# Patient Record
Sex: MALE | Employment: FULL TIME | URBAN - METROPOLITAN AREA
[De-identification: names, ages, dates, MRNs, and addresses within clinical notes are randomized per-mention and may not be internally consistent; named-entity substitution may affect disease eponyms.]

---

## 2017-06-28 ENCOUNTER — OFFICE VISIT (OUTPATIENT)
Dept: INTERNAL MEDICINE CLINIC | Age: 47
End: 2017-06-28

## 2017-06-28 VITALS
WEIGHT: 198.13 LBS | DIASTOLIC BLOOD PRESSURE: 66 MMHG | OXYGEN SATURATION: 98 % | RESPIRATION RATE: 18 BRPM | HEART RATE: 76 BPM | BODY MASS INDEX: 28.36 KG/M2 | SYSTOLIC BLOOD PRESSURE: 111 MMHG | HEIGHT: 70 IN | TEMPERATURE: 98.2 F

## 2017-06-28 DIAGNOSIS — Z00.00 PREVENTATIVE HEALTH CARE: Primary | ICD-10-CM

## 2017-06-28 RX ORDER — GLUCOSAMINE SULFATE 1500 MG
POWDER IN PACKET (EA) ORAL DAILY
COMMUNITY

## 2017-06-28 RX ORDER — LANOLIN ALCOHOL/MO/W.PET/CERES
1000 CREAM (GRAM) TOPICAL DAILY
COMMUNITY

## 2017-06-28 RX ORDER — LANOLIN ALCOHOL/MO/W.PET/CERES
1 CREAM (GRAM) TOPICAL DAILY
COMMUNITY

## 2017-06-28 NOTE — PATIENT INSTRUCTIONS

## 2017-06-28 NOTE — PROGRESS NOTES
HISTORY OF PRESENT ILLNESS  Lon Cunningham is a 49 y.o. male. HPI  Lon Cunningham is here for complete health maintenance physical exam and screening. he does have other concerns. Sporadic elevation in BP in May - saw bp's 140-150's/  over several weeks. Logged bps over last 2 weeks - 110-130's/60-70's  Feels fine. allergic rhinitis - mild chronic congestion - using flonase prn. Health maintenance hx includes:  Exercise: very active. Form of exercise:  Biking, wts, running   Diet: generally follows a low fat low cholesterol diet, generally follows a low sodium diet, pescatarian  [employer]       No results found for: CHOL, CHOLPOCT, CHOLX, CHLST, CHOLV, HDL, HDLPOC, LDL, LDLCPOC, LDLC, DLDLP, VLDLC, VLDL, TGLX, TRIGL, TRIGP, TGLPOCT, CHHD, CHHDX    No results found for: GLU, GLUCPOC      Immunizations:     Immunization History   Administered Date(s) Administered    Td, Adsorbed PF 08/21/2013    Tdap 06/28/2016      Immunization status: up to date and documented. Social History     Social History    Marital status:      Spouse name: N/A    Number of children: N/A    Years of education: N/A     Occupational History    Not on file.      Social History Main Topics    Smoking status: Never Smoker    Smokeless tobacco: Never Used    Alcohol use 1.5 oz/week     1 Glasses of wine, 1 Cans of beer, 1 Shots of liquor per week    Drug use: No    Sexual activity: Yes     Partners: Female     Other Topics Concern    Not on file     Social History Narrative     Past Surgical History:   Procedure Laterality Date    HX HERNIA REPAIR  2006    bilateral inguinal     Family History   Problem Relation Age of Onset    Thyroid Disease Mother     Hypertension Mother     High Cholesterol Mother     COPD Father     Hypertension Father     Cancer Maternal Grandmother      breast    Diabetes Maternal Grandmother      type 1     Current Outpatient Prescriptions on File Prior to Visit Medication Sig Dispense Refill    multivitamin (ONE A DAY) tablet Take 1 Tab by mouth daily.  MAGNESIUM CITRATE PO Take  by mouth.  ibuprofen (MOTRIN) 200 mg tablet Take  by mouth.  tretinoin (RETIN-A) 0.05 % topical cream APPLY TO AFFECTED AREA NIGHTLY 20 g 3    fluticasone (FLONASE) 50 mcg/actuation nasal spray USE 2 SPRAY IN EACH NOSTRIL   EVERY DAY (Patient taking differently: daily as needed. USE 2 SPRAY IN EACH NOSTRIL   EVERY DAY) 16 g 11     No current facility-administered medications on file prior to visit. .    Review of Systems   Constitutional: Positive for diaphoresis (rare sporadic soaking night sweats without any other symptoms). Negative for malaise/fatigue and weight loss. Eyes: Negative for blurred vision and pain. Respiratory: Negative for cough, shortness of breath and wheezing. Cardiovascular: Negative for chest pain, palpitations and leg swelling. Gastrointestinal: Negative for abdominal pain, blood in stool, constipation, diarrhea, heartburn, nausea and vomiting. Genitourinary: Negative. Musculoskeletal: Negative for back pain, joint pain and myalgias. Skin: Negative for rash. Neurological: Negative for dizziness and headaches. Endo/Heme/Allergies: Positive for environmental allergies. Does not bruise/bleed easily. Psychiatric/Behavioral: Negative for depression. The patient is not nervous/anxious and does not have insomnia. Physical Exam   Constitutional: He is oriented to person, place, and time. He appears well-developed and well-nourished. No distress. Body mass index is 28.43 kg/(m^2). /66 (BP 1 Location: Left arm, BP Patient Position: Sitting)  Pulse 76  Temp 98.2 °F (36.8 °C) (Oral)   Resp 18  Ht 5' 10\" (1.778 m)  Wt 198 lb 2 oz (89.9 kg)  SpO2 98%  BMI 28.43 kg/m2   HENT:   Head: Normocephalic and atraumatic.    Right Ear: Hearing, tympanic membrane and ear canal normal.   Left Ear: Hearing, tympanic membrane and ear canal normal.   Nose: Nose normal.   Mouth/Throat: Oropharynx is clear and moist and mucous membranes are normal. Normal dentition. Eyes: Conjunctivae and lids are normal. Pupils are equal, round, and reactive to light. Right eye exhibits no discharge. Left eye exhibits no discharge. No scleral icterus. Neck: Trachea normal. No thyromegaly present. Cardiovascular: Normal rate, regular rhythm, normal heart sounds, intact distal pulses and normal pulses. Exam reveals no gallop and no friction rub. No murmur heard. Pulmonary/Chest: Effort normal and breath sounds normal. No respiratory distress. Abdominal: Soft. Normal appearance and bowel sounds are normal. He exhibits no distension and no mass. There is no hepatosplenomegaly. There is no tenderness. There is no CVA tenderness. Musculoskeletal: Normal range of motion. He exhibits no edema or tenderness. Lymphadenopathy:     He has no cervical adenopathy. Right: No inguinal adenopathy present. Left: No inguinal adenopathy present. Neurological: He is alert and oriented to person, place, and time. Skin: Skin is warm and dry. No rash noted. He is not diaphoretic. Scattered small pigmented typical benign appearing nevi over back,      Psychiatric: He has a normal mood and affect. His speech is normal and behavior is normal. Judgment and thought content normal. Cognition and memory are normal.   Nursing note and vitals reviewed. ASSESSMENT and PLAN  Alba Quinteros was seen today for well male. Diagnoses and all orders for this visit:    Preventative health care  -     METABOLIC PANEL, BASIC  -     MAGNESIUM  -     LIPID PANEL  -     CBC WITH AUTOMATED DIFF  Taya Mitchell was counseled on age-appropriate/ guideline-based risk prevention behaviors and screening for a 49y.o. year old   male . We also discussed adjustments in screening based on family history if necessary.    Printed instructions for preventative screening guidelines and healthy behaviors given to patient with after visit summary. Sporadic elevation of bp now normalized. Log blood pressures at home while sitting, relaxed 3-5 times monthly a  Pt educated on goal BP of 130/80 on average or lower. Call office as soon as possible if BP's over 140/90 or below 110/50 on multiple occasions and/or with symptoms of dizziness, chest pain, shortness of breath, headache or ankle swelling. Recheck log and bp here in 1 year(s). .  Follow-up Disposition:  Return in about 1 year (around 6/28/2018).

## 2017-06-28 NOTE — MR AVS SNAPSHOT
Visit Information Date & Time Provider Department Dept. Phone Encounter #  
 6/28/2017 11:15 AM Phillip Mar MD [facility] [address] [id number] Follow-up Instructions Return in about 1 year (around 6/28/2018). Upcoming Health Maintenance Date Due DTaP/Tdap/Td series (1 - Tdap) 8/22/2013 INFLUENZA AGE 9 TO ADULT 8/1/2017 Allergies as of 6/28/2017  Review Complete On: 6/28/2017 By: Phillip Mar MD  
  
 Severity Noted Reaction Type Reactions Tiger Balm [Camphor-menthol]  05/31/2011    Contact Dermatitis Current Immunizations  Reviewed on 6/28/2017 Name Date Td, Adsorbed PF 8/21/2013  6:17 PM  
 Tdap 6/28/2016 Reviewed by Phillip Mar MD on 6/28/2017 at 11:45 AM  
 Reviewed by Phillip Mar MD on 6/28/2017 at 11:46 AM  
You Were Diagnosed With   
  
 Codes Comments Preventative health care    -  Primary ICD-10-CM: Z00.00 ICD-9-CM: V70.0 Vitals BP Pulse Temp Resp Height(growth percentile) Weight(growth percentile) 111/66 (BP 1 Location: Left arm, BP Patient Position: Sitting) 76 98.2 °F (36.8 °C) (Oral) 18 5' 10\" (1.778 m) 198 lb 2 oz (89.9 kg) SpO2 BMI Smoking Status 98% 28.43 kg/m2 Never Smoker Vitals History BMI and BSA Data Body Mass Index Body Surface Area  
 28.43 kg/m 2 2.11 m 2 Preferred Pharmacy Pharmacy Name Phone [id number] [address] [phone] Your Updated Medication List  
  
   
This list is accurate as of: 6/28/17 12:01 PM.  Always use your most recent med list.  
  
  
  
  
 CALCIUM 600 + D 600-125 mg-unit Tab Generic drug:  calcium-cholecalciferol (d3) Take  by mouth. fluticasone 50 mcg/actuation nasal spray Commonly known as:  FLONASE  
USE 2 SPRAY IN EACH NOSTRIL   EVERY DAY  
  
 glucosamine-chondroitin 500-400 mg Cap Commonly known as:  20 [facility] Take 1 Cap by mouth daily. ibuprofen 200 mg tablet Commonly known as:  MOTRIN Take  by mouth. MAGNESIUM CITRATE PO Take  by mouth.  
  
 multivitamin tablet Commonly known as:  ONE A DAY Take 1 Tab by mouth daily. PROBIOTIC 4X 10-15 mg Tbec Generic drug:  B.infantis-B.ani-B.long-B.bifi Take  by mouth. tretinoin 0.05 % topical cream  
Commonly known as:  RETIN-A  
APPLY TO AFFECTED AREA NIGHTLY  
  
 VITAMIN B-12 1,000 mcg tablet Generic drug:  cyanocobalamin Take 1,000 mcg by mouth daily. VITAMIN D3 1,000 unit Cap Generic drug:  cholecalciferol Take  by mouth daily. zinc 50 mg Tab tablet Take  by mouth daily. We Performed the Following CBC WITH AUTOMATED DIFF [82266 CPT(R)] LIPID PANEL [80509 CPT(R)] MAGNESIUM [id number] CPT(R)] METABOLIC PANEL, BASIC [29516 CPT(R)] Follow-up Instructions Return in about 1 year (around 6/28/2018). Patient Instructions Well Visit, Ages 25 to 48: Care Instructions Your Care Instructions Physical exams can help you stay healthy. Your doctor has checked your overall health and may have suggested ways to take good care of yourself. He or she also may have recommended tests. At home, you can help prevent illness with healthy eating, regular exercise, and other steps. Follow-up care is a key part of your treatment and safety. Be sure to make and go to all appointments, and call your doctor if you are having problems. It's also a good idea to know your test results and keep a list of the medicines you take. How can you care for yourself at home? · Reach and stay at a healthy weight. This will lower your risk for many problems, such as obesity, diabetes, heart disease, and high blood pressure. · Get at least 30 minutes of physical activity on most days of the week. Walking is a good choice.  You also may want to do other activities, such as running, swimming, cycling, or playing tennis or team sports. Discuss any changes in your exercise program with your doctor. · Do not smoke or allow others to smoke around you. If you need help quitting, talk to your doctor about stop-smoking programs and medicines. These can increase your chances of quitting for good. · Talk to your doctor about whether you have any risk factors for sexually transmitted infections (STIs). Having one sex partner (who does not have STIs and does not have sex with anyone else) is a good way to avoid these infections. · Use birth control if you do not want to have children at this time. Talk with your doctor about the choices available and what might be best for you. · Protect your skin from too much sun. When you're outdoors from 10 a.m. to 4 p.m., stay in the shade or cover up with clothing and a hat with a wide brim. Wear sunglasses that block UV rays. Even when it's cloudy, put broad-spectrum sunscreen (SPF 30 or higher) on any exposed skin. · See a dentist one or two times a year for checkups and to have your teeth cleaned. · Wear a seat belt in the car. · Drink alcohol in moderation, if at all. That means no more than 2 drinks a day for men and 1 drink a day for women. Follow your doctor's advice about when to have certain tests. These tests can spot problems early. For everyone · Cholesterol. Have the fat (cholesterol) in your blood tested after age 21. Your doctor will tell you how often to have this done based on your age, family history, or other things that can increase your risk for heart disease. · Blood pressure. Have your blood pressure checked during a routine doctor visit. Your doctor will tell you how often to check your blood pressure based on your age, your blood pressure results, and other factors. · Vision. Talk with your doctor about how often to have a glaucoma test. 
· Diabetes. Ask your doctor whether you should have tests for diabetes. · Colon cancer. Have a test for colon cancer at age 48. You may have one of several tests. If you are younger than 48, you may need a test earlier if you have any risk factors. Risk factors include whether you already had a precancerous polyp removed from your colon or whether your parent, brother, sister, or child has had colon cancer. For women · Breast exam and mammogram. Talk to your doctor about when you should have a clinical breast exam and a mammogram. Medical experts differ on whether and how often women under 50 should have these tests. Your doctor can help you decide what is right for you. · Pap test and pelvic exam. Begin Pap tests at age 24. A Pap test is the best way to find cervical cancer. The test often is part of a pelvic exam. Ask how often to have this test. 
· Tests for sexually transmitted infections (STIs). Ask whether you should have tests for STIs. You may be at risk if you have sex with more than one person, especially if your partners do not wear condoms. For men · Tests for sexually transmitted infections (STIs). Ask whether you should have tests for STIs. You may be at risk if you have sex with more than one person, especially if you do not wear a condom. · Testicular cancer exam. Ask your doctor whether you should check your testicles regularly. · Prostate exam. Talk to your doctor about whether you should have a blood test (called a PSA test) for prostate cancer. Experts differ on whether and when men should have this test. Some experts suggest it if you are older than 39 and are -American or have a father or brother who got prostate cancer when he was younger than 72. When should you call for help? Watch closely for changes in your health, and be sure to contact your doctor if you have any problems or symptoms that concern you. Where can you learn more? Go to http://regina-karan.info/. Enter P072 in the search box to learn more about \"Well Visit, Ages 25 to 48: Care Instructions. \" Current as of: July 19, 2016 Content Version: 11.3 © 1169-3791 [employer], [employer]. Care instructions adapted under license by [employer] (which disclaims liability or warranty for this information). If you have questions about a medical condition or this instruction, always ask your healthcare professional. [place] [address] any warranty or liability for your use of this information. Introducing [facility]! Dear Hosie Gosselin: Thank you for requesting a [employer] account. Our records indicate that you have previously registered for a [employer] account but its currently inactive. Please call our [employer] support line at [phone]. Additional Information If you have questions, please visit the Frequently Asked Questions section of the [employer] website at [email or web address] Remember, [employer] is NOT to be used for urgent needs. For medical emergencies, dial 911. Now available from your iPhone and Android! Please provide this summary of care documentation to your next provider. Your primary care clinician is listed as Deloria Reason. If you have any questions after today's visit, please call [phone].

## 2017-06-29 LAB
BASOPHILS # BLD AUTO: 0 X10E3/UL (ref 0–0.2)
BASOPHILS NFR BLD AUTO: 0 %
BUN SERPL-MCNC: 16 MG/DL (ref 6–24)
BUN/CREAT SERPL: 19 (ref 9–20)
CALCIUM SERPL-MCNC: 9.8 MG/DL (ref 8.7–10.2)
CHLORIDE SERPL-SCNC: 100 MMOL/L (ref 96–106)
CHOLEST SERPL-MCNC: 212 MG/DL (ref 100–199)
CO2 SERPL-SCNC: 24 MMOL/L (ref 18–29)
CREAT SERPL-MCNC: 0.86 MG/DL (ref 0.76–1.27)
EOSINOPHIL # BLD AUTO: 0 X10E3/UL (ref 0–0.4)
EOSINOPHIL NFR BLD AUTO: 0 %
ERYTHROCYTE [DISTWIDTH] IN BLOOD BY AUTOMATED COUNT: 13.1 % (ref 12.3–15.4)
GLUCOSE SERPL-MCNC: 84 MG/DL (ref 65–99)
HCT VFR BLD AUTO: 42.9 % (ref 37.5–51)
HDLC SERPL-MCNC: 87 MG/DL
HGB BLD-MCNC: 14.6 G/DL (ref 12.6–17.7)
IMM GRANULOCYTES # BLD: 0 X10E3/UL (ref 0–0.1)
IMM GRANULOCYTES NFR BLD: 0 %
INTERPRETATION, 910389: NORMAL
LDLC SERPL CALC-MCNC: 110 MG/DL (ref 0–99)
LYMPHOCYTES # BLD AUTO: 1 X10E3/UL (ref 0.7–3.1)
LYMPHOCYTES NFR BLD AUTO: 9 %
MAGNESIUM SERPL-MCNC: 2.3 MG/DL (ref 1.6–2.3)
MCH RBC QN AUTO: 30.9 PG (ref 26.6–33)
MCHC RBC AUTO-ENTMCNC: 34 G/DL (ref 31.5–35.7)
MCV RBC AUTO: 91 FL (ref 79–97)
MONOCYTES # BLD AUTO: 0.7 X10E3/UL (ref 0.1–0.9)
MONOCYTES NFR BLD AUTO: 6 %
NEUTROPHILS # BLD AUTO: 9.5 X10E3/UL (ref 1.4–7)
NEUTROPHILS NFR BLD AUTO: 85 %
PLATELET # BLD AUTO: 234 X10E3/UL (ref 150–379)
POTASSIUM SERPL-SCNC: 4.2 MMOL/L (ref 3.5–5.2)
RBC # BLD AUTO: 4.72 X10E6/UL (ref 4.14–5.8)
SODIUM SERPL-SCNC: 139 MMOL/L (ref 134–144)
TRIGL SERPL-MCNC: 76 MG/DL (ref 0–149)
VLDLC SERPL CALC-MCNC: 15 MG/DL (ref 5–40)
WBC # BLD AUTO: 11.2 X10E3/UL (ref 3.4–10.8)

## 2018-02-21 ENCOUNTER — APPOINTMENT (OUTPATIENT)
Dept: GENERAL RADIOLOGY | Age: 48
End: 2018-02-21
Attending: EMERGENCY MEDICINE
Payer: COMMERCIAL

## 2018-02-21 ENCOUNTER — TELEPHONE (OUTPATIENT)
Dept: INTERNAL MEDICINE CLINIC | Age: 48
End: 2018-02-21

## 2018-02-21 ENCOUNTER — HOSPITAL ENCOUNTER (EMERGENCY)
Age: 48
Discharge: HOME | End: 2018-02-21
Attending: EMERGENCY MEDICINE
Payer: COMMERCIAL

## 2018-02-21 VITALS
WEIGHT: 210.8 LBS | TEMPERATURE: 98.4 F | HEIGHT: 72 IN | BODY MASS INDEX: 28.55 KG/M2 | DIASTOLIC BLOOD PRESSURE: 81 MMHG | SYSTOLIC BLOOD PRESSURE: 150 MMHG | OXYGEN SATURATION: 98 % | HEART RATE: 71 BPM | RESPIRATION RATE: 18 BRPM

## 2018-02-21 DIAGNOSIS — L03.90 CELLULITIS, UNSPECIFIED CELLULITIS SITE: Primary | ICD-10-CM

## 2018-02-21 LAB
BASOPHILS # BLD: 0 K/UL (ref 0–0.1)
BASOPHILS NFR BLD: 0 % (ref 0–1)
DIFFERENTIAL METHOD BLD: NORMAL
EOSINOPHIL # BLD: 0.1 K/UL (ref 0–0.4)
EOSINOPHIL NFR BLD: 1 % (ref 0–7)
ERYTHROCYTE [DISTWIDTH] IN BLOOD BY AUTOMATED COUNT: 12.1 % (ref 11.5–14.5)
HCT VFR BLD AUTO: 39.4 % (ref 36.6–50.3)
HGB BLD-MCNC: 13.8 G/DL (ref 12.1–17)
IMM GRANULOCYTES # BLD: 0 K/UL (ref 0–0.04)
IMM GRANULOCYTES NFR BLD AUTO: 0 % (ref 0–0.5)
LYMPHOCYTES # BLD: 1.4 K/UL (ref 0.8–3.5)
LYMPHOCYTES NFR BLD: 15 % (ref 12–49)
MCH RBC QN AUTO: 31.8 PG (ref 26–34)
MCHC RBC AUTO-ENTMCNC: 35 G/DL (ref 30–36.5)
MCV RBC AUTO: 90.8 FL (ref 80–99)
MONOCYTES # BLD: 1 K/UL (ref 0–1)
MONOCYTES NFR BLD: 10 % (ref 5–13)
NEUTS SEG # BLD: 7.2 K/UL (ref 1.8–8)
NEUTS SEG NFR BLD: 74 % (ref 32–75)
NRBC # BLD: 0 K/UL (ref 0–0.01)
NRBC BLD-RTO: 0 PER 100 WBC
PLATELET # BLD AUTO: 195 K/UL (ref 150–400)
PMV BLD AUTO: 9.9 FL (ref 8.9–12.9)
RBC # BLD AUTO: 4.34 M/UL (ref 4.1–5.7)
WBC # BLD AUTO: 9.7 K/UL (ref 4.1–11.1)

## 2018-02-21 PROCEDURE — 99281 EMR DPT VST MAYX REQ PHY/QHP: CPT

## 2018-02-21 PROCEDURE — 96365 THER/PROPH/DIAG IV INF INIT: CPT

## 2018-02-21 PROCEDURE — 74011000258 HC RX REV CODE- 258: Performed by: EMERGENCY MEDICINE

## 2018-02-21 PROCEDURE — 85025 COMPLETE CBC W/AUTO DIFF WBC: CPT | Performed by: EMERGENCY MEDICINE

## 2018-02-21 PROCEDURE — 36415 COLL VENOUS BLD VENIPUNCTURE: CPT | Performed by: EMERGENCY MEDICINE

## 2018-02-21 PROCEDURE — 73080 X-RAY EXAM OF ELBOW: CPT

## 2018-02-21 PROCEDURE — 74011250636 HC RX REV CODE- 250/636: Performed by: EMERGENCY MEDICINE

## 2018-02-21 RX ORDER — CEPHALEXIN 500 MG/1
500 CAPSULE ORAL 4 TIMES DAILY
Qty: 28 CAP | Refills: 0 | Status: SHIPPED | OUTPATIENT
Start: 2018-02-21 | End: 2018-02-28

## 2018-02-21 RX ADMIN — SODIUM CHLORIDE 1000 MG: 900 INJECTION, SOLUTION INTRAVENOUS at 20:46

## 2018-02-22 NOTE — ED TRIAGE NOTES
TRIAGE NOTE: Pt noticed open skin on his LEFT elbow since Saturday night. Today pt states his elbow swelled and became painful with radiation down his arm. Denies injury, denies fever, denies chills.

## 2018-02-22 NOTE — ED PROVIDER NOTES
HPI Comments: The patient has an abrasion to the left elbow, that occurred 3 days ago. Over the last 2 or 3 hours the area around the abrasion has become swollen and red and painful. The patient denies fever, vomiting or other systemic symptoms. Patient is a 49 y.o. male presenting with elbow pain. Elbow Pain           Past Medical History:   Diagnosis Date    Arthritis     lumbar djd - Dr. Debra Ortiz       Past Surgical History:   Procedure Laterality Date    HX HERNIA REPAIR  2006    bilateral inguinal         Family History:   Problem Relation Age of Onset    Thyroid Disease Mother     Hypertension Mother     High Cholesterol Mother     COPD Father     Hypertension Father     Cancer Maternal Grandmother      breast    Diabetes Maternal Grandmother      type 1       Social History     Social History    Marital status:      Spouse name: N/A    Number of children: N/A    Years of education: N/A     Occupational History    Not on file. Social History Main Topics    Smoking status: Never Smoker    Smokeless tobacco: Never Used    Alcohol use 1.5 oz/week     1 Glasses of wine, 1 Cans of beer, 1 Shots of liquor per week    Drug use: No    Sexual activity: Yes     Partners: Female     Other Topics Concern    Not on file     Social History Narrative         ALLERGIES: Tiger balm [camphor-menthol]    Review of Systems   Constitutional: Negative for fever. Gastrointestinal: Negative for vomiting. Skin: Positive for wound. Vitals:    02/21/18 2007   BP: 148/89   Pulse: 67   Resp: 18   Temp: 97.9 °F (36.6 °C)   SpO2: 98%   Weight: 95.6 kg (210 lb 12.8 oz)   Height: 6' (1.829 m)            Physical Exam   Constitutional: He appears well-developed and well-nourished. No distress. HENT:   Head: Normocephalic. Neck: Normal range of motion. Cardiovascular: Normal rate and regular rhythm. No murmur heard.   Pulmonary/Chest: Effort normal and breath sounds normal. No respiratory distress. Musculoskeletal: Normal range of motion. He exhibits no edema. Neurological: He is alert. He has normal strength. No cranial nerve deficit. Skin: Skin is warm and dry. There is a 1 cm abrasion to the left elbow, over the olecranon, and there is mild swelling of the olecranon bursa. I am unable to appreciate any fluctuance. There is no red streak or other abnormality noted. Psychiatric: He has a normal mood and affect. His behavior is normal.   Nursing note and vitals reviewed.        [facility]      ED Course       Procedures

## 2018-02-22 NOTE — ED NOTES
MD reviewed discharge instructions with the patient. The patient verbalized understanding. Patient ambulated out of the emergency department without assistance. Patient continues to have pain, but is in no apparent distress.

## 2018-02-22 NOTE — DISCHARGE INSTRUCTIONS
Cellulitis: Care Instructions  Your Care Instructions    Cellulitis is a skin infection. It often occurs after a break in the skin from a scrape, cut, bite, or puncture, or after a rash. The doctor has checked you carefully, but problems can develop later. If you notice any problems or new symptoms, get medical treatment right away. Follow-up care is a key part of your treatment and safety. Be sure to make and go to all appointments, and call your doctor if you are having problems. It's also a good idea to know your test results and keep a list of the medicines you take. How can you care for yourself at home? · Take your antibiotics as directed. Do not stop taking them just because you feel better. You need to take the full course of antibiotics. · Prop up the infected area on pillows to reduce pain and swelling. Try to keep the area above the level of your heart as often as you can. · If your doctor told you how to care for your wound, follow your doctor's instructions. If you did not get instructions, follow this general advice:  ¨ Wash the wound with clean water 2 times a day. Don't use hydrogen peroxide or alcohol, which can slow healing. ¨ You may cover the wound with a thin layer of petroleum jelly, such as Vaseline, and a nonstick bandage. ¨ Apply more petroleum jelly and replace the bandage as needed. · Be safe with medicines. Take pain medicines exactly as directed. ¨ If the doctor gave you a prescription medicine for pain, take it as prescribed. ¨ If you are not taking a prescription pain medicine, ask your doctor if you can take an over-the-counter medicine. To prevent cellulitis in the future  · Try to prevent cuts, scrapes, or other injuries to your skin. Cellulitis most often occurs where there is a break in the skin. · If you get a scrape, cut, mild burn, or bite, wash the wound with clean water as soon as you can to help avoid infection.  Don't use hydrogen peroxide or alcohol, which can slow healing. · If you have swelling in your legs (edema), support stockings and good skin care may help prevent leg sores and cellulitis. · Take care of your feet, especially if you have diabetes or other conditions that increase the risk of infection. Wear shoes and socks. Do not go barefoot. If you have athlete's foot or other skin problems on your feet, talk to your doctor about how to treat them. When should you call for help? Call your doctor now or seek immediate medical care if:  ? · You have signs that your infection is getting worse, such as:  ¨ Increased pain, swelling, warmth, or redness. ¨ Red streaks leading from the area. ¨ Pus draining from the area. ¨ A fever. ? · You get a rash. ? Watch closely for changes in your health, and be sure to contact your doctor if:  ? · You are not getting better after 1 day (24 hours). ? · You do not get better as expected. Where can you learn more? Go to http://regina-karan.info/. Brooks Martines in the search box to learn more about \"Cellulitis: Care Instructions. \"  Current as of: October 13, 2016  Content Version: 11.4  © 4639-6937 [employer]. Care instructions adapted under license by [employer] (which disclaims liability or warranty for this information). If you have questions about a medical condition or this instruction, always ask your healthcare professional. [place] [address] any warranty or liability for your use of this information.

## 2018-02-22 NOTE — TELEPHONE ENCOUNTER
Pt called me with recent small abraision over olecranon of elbow since sat. Traveling in [place] over weekend. No known injury. Now over last 2 hours increasing elbow swelling, redness, pain referred down to wrist.  I referred him to [facility] for evaluation of rapidly progressing cellulitis  ASAP. He is driving in from [facility] and will arrive in ER in 40 min. I spoke to Dr. Manas Pantoja there and reviewed case with him and need for rapid evaluation.

## 2018-07-19 RX ORDER — SCOLOPAMINE TRANSDERMAL SYSTEM 1 MG/1
1 PATCH, EXTENDED RELEASE TRANSDERMAL
Qty: 1 PATCH | Refills: 0 | Status: SHIPPED | OUTPATIENT
Start: 2018-07-19

## 2018-09-07 ENCOUNTER — OFFICE VISIT (OUTPATIENT)
Dept: INTERNAL MEDICINE CLINIC | Age: 48
End: 2018-09-07

## 2018-09-07 VITALS
DIASTOLIC BLOOD PRESSURE: 86 MMHG | WEIGHT: 208.25 LBS | HEART RATE: 71 BPM | OXYGEN SATURATION: 96 % | HEIGHT: 72 IN | RESPIRATION RATE: 18 BRPM | BODY MASS INDEX: 28.21 KG/M2 | SYSTOLIC BLOOD PRESSURE: 142 MMHG | TEMPERATURE: 98.8 F

## 2018-09-07 DIAGNOSIS — H93.11 TINNITUS OF RIGHT EAR: Primary | ICD-10-CM

## 2018-09-07 DIAGNOSIS — H69.81 DYSFUNCTION OF RIGHT EUSTACHIAN TUBE: ICD-10-CM

## 2018-09-07 RX ORDER — FEXOFENADINE HCL AND PSEUDOEPHEDRINE HCI 180; 240 MG/1; MG/1
1 TABLET, EXTENDED RELEASE ORAL DAILY
Qty: 30 TAB | Refills: 0 | Status: SHIPPED | OUTPATIENT
Start: 2018-09-07

## 2018-09-07 NOTE — PROGRESS NOTES
HISTORY OF PRESENT ILLNESS Kim Almendarez is a 51 y.o. male. HPI Problem visit: 
Kim Almendarez is here for complaint of motor sounding tinnitis. Problem began 2 month(s) ago. Severity is moderate Character of problem: low grade hum motor sound in R ear In am notices the problem worse. nothing makes the problem better. Associated symptoms include: no otalgia. Worse after swimming. The patient denies fevers, chills or sweats. No headache Treatments tried include: alcohol, robitussin Allergies are controlled. Patient Active Problem List  
Diagnosis Code  Epicondylitis [id number]  Insomnia G47.00 Past Medical History:  
Diagnosis Date  Arthritis   
 lumbar djd - Dr. Suzanne Frederick Allergies Allergen Reactions  Tiger Balm [Camphor-Menthol] Contact Dermatitis Current Outpatient Prescriptions on File Prior to Visit Medication Sig Dispense Refill  tretinoin (RETIN-A) 0.05 % topical cream APPLY TO AFFECTED AREA NIGHTLY 20 g 4  cholecalciferol (VITAMIN D3) 1,000 unit cap Take  by mouth daily.  calcium-cholecalciferol, d3, (CALCIUM 600 + D) 600-125 mg-unit tab Take  by mouth.  cyanocobalamin (VITAMIN B-12) 1,000 mcg tablet Take 1,000 mcg by mouth daily.  zinc 50 mg tab tablet Take  by mouth daily.  B.infantis-B.ani-B.long-B.bifi (PROBIOTIC 4X) 10-15 mg TbEC Take  by mouth.  glucosamine-chondroitin (ARTHX) 500-400 mg cap Take 1 Cap by mouth daily.  multivitamin (ONE A DAY) tablet Take 1 Tab by mouth daily.  MAGNESIUM CITRATE PO Take  by mouth.  ibuprofen (MOTRIN) 200 mg tablet Take  by mouth.  scopolamine (TRANSDERM-SCOP) 1 mg over 3 days pt3d 1 Patch by TransDERmal route every seventy-two (72) hours. 1 Patch 0 No current facility-administered medications on file prior to visit. Social History Substance Use Topics  Smoking status: Never Smoker  Smokeless tobacco: Never Used  Alcohol use 1.5 oz/week 1 Glasses of wine, 1 Cans of beer, 1 Shots of liquor per week ROS Physical Exam  
HENT:  
Right Ear: Tympanic membrane and ear canal normal. No drainage, swelling or tenderness. Tympanic membrane is not injected, not scarred, not perforated, not erythematous, not retracted and not bulging. No decreased hearing is noted. Left Ear: No drainage or swelling. Tympanic membrane is erythematous. Tympanic membrane is not scarred, not retracted and not bulging. No decreased hearing is noted. Nose: Nose normal. Right sinus exhibits no maxillary sinus tenderness and no frontal sinus tenderness. Left sinus exhibits no maxillary sinus tenderness and no frontal sinus tenderness. Mouth/Throat: Oropharynx is clear and moist and mucous membranes are normal. No oropharyngeal exudate, posterior oropharyngeal edema or posterior oropharyngeal erythema. Cardiovascular: Normal rate and regular rhythm. No murmur heard. Pulmonary/Chest: No accessory muscle usage. No respiratory distress. He has no decreased breath sounds. He has no wheezes. He has no rhonchi. He has no rales. Lymphadenopathy:  
     Right cervical: No superficial cervical and no deep cervical adenopathy present. Left cervical: No superficial cervical and no deep cervical adenopathy present. Right: No supraclavicular adenopathy present. Left: No supraclavicular adenopathy present. Visit Vitals  /86 (BP 1 Location: Left arm, BP Patient Position: Sitting)  Pulse 71  Temp 98.8 °F (37.1 °C) (Oral)  Resp 18  Ht 6' (1.829 m)  Wt 208 lb 4 oz (94.5 kg)  SpO2 96%  BMI 28.24 kg/m2 ASSESSMENT and PLAN Diagnoses and all orders for this visit: 
 
1. Tinnitus of right ear -new onset, low frequency. ? Eust tube dysfunction. Try antihist, decongestant daily. Consider ENT referral if not improving.    The patient was given written instructions detailing his diagnoses and recommendations made today at the visit. -     fexofenadine-pseudoephedrine (ALLEGRA-D 24) 180-240 mg per tablet; Take 1 Tab by mouth daily. 2. Dysfunction of right eustachian tube 
-     fexofenadine-pseudoephedrine (ALLEGRA-D 24) 180-240 mg per tablet; Take 1 Tab by mouth daily. Follow-up Disposition: Not on File

## 2018-09-07 NOTE — PATIENT INSTRUCTIONS
Eustachian Tube Problems: Care Instructions Your Care Instructions The eustachian (say \"you-STAY-shee-un\") tubes run between the inside of the ears and the throat. They keep air pressure stable in the ears. If your eustachian tubes become blocked, the air pressure in your ears changes. The fluids from a cold can clog eustachian tubes, causing pain in the ears. A quick change in air pressure can cause eustachian tubes to close up. This might happen when an airplane changes altitude or when a  goes up or down underwater. Eustachian tube problems often clear up on their own or after antibiotic treatment. If your tubes continue to be blocked, you may need surgery. Follow-up care is a key part of your treatment and safety. Be sure to make and go to all appointments, and call your doctor if you are having problems. It's also a good idea to know your test results and keep a list of the medicines you take. How can you care for yourself at home? · To ease ear pain, apply a warm washcloth or a heating pad set on low. There may be some drainage from the ear when the heat melts earwax. Put a cloth between the heat source and your skin. Do not use a heating pad with children. · If your doctor prescribed antibiotics, take them as directed. Do not stop taking them just because you feel better. You need to take the full course of antibiotics. · Your doctor may recommend over-the-counter medicine. Be safe with medicines. Oral or nasal decongestants may relieve ear pain. Avoid decongestants that are combined with antihistamines, which tend to cause more blockage. But if allergies seem to be the problem, your doctor may recommend a combination. Be careful with cough and cold medicines. Don't give them to children younger than 6, because they don't work for children that age and can even be harmful. For children 6 and older, always follow all the instructions carefully.  Make sure you know how much medicine to give and how long to use it. And use the dosing device if one is included. When should you call for help? Call your doctor now or seek immediate medical care if: 
  · You develop sudden, complete hearing loss.  
  · You have severe pain or feel dizzy.  
  · You have new or increasing pus or blood draining from your ear.  
  · You have redness, swelling, or pain around or behind the ear.  
 Watch closely for changes in your health, and be sure to contact your doctor if: 
  · You do not get better after 2 weeks.  
  · You have any new symptoms, such as itching or a feeling of fullness in the ear. Where can you learn more? Go to http://regina-karan.info/. Enter Y822 in the search box to learn more about \"Eustachian Tube Problems: Care Instructions. \" Current as of: May 12, 2017 Content Version: 11.7 © 3781-4046 [employer]. Care instructions adapted under license by [employer] (which disclaims liability or warranty for this information). If you have questions about a medical condition or this instruction, always ask your healthcare professional. [address] any warranty or liability for your use of this information.

## 2022-09-12 ENCOUNTER — OFFICE VISIT (OUTPATIENT)
Dept: ORTHOPEDIC SURGERY | Age: 52
End: 2022-09-12
Payer: COMMERCIAL

## 2022-09-12 VITALS — BODY MASS INDEX: 26.41 KG/M2 | HEIGHT: 72 IN | WEIGHT: 195 LBS

## 2022-09-12 DIAGNOSIS — M25.561 ACUTE PAIN OF RIGHT KNEE: Primary | ICD-10-CM

## 2022-09-12 PROCEDURE — 99203 OFFICE O/P NEW LOW 30 MIN: CPT | Performed by: ORTHOPAEDIC SURGERY

## 2022-09-12 NOTE — PROGRESS NOTES
Noa Gonsalves (: 1970) is a 47 y.o. male, patient, here for evaluation of the following chief complaint(s):  Knee Pain (Patient here for right knee pain.)       HPI:    Patient is a 58-year-old male reports right medial sided knee pain is progressively worsened over the last year. He is also reporting the last month or so has noticed pain and swelling the posterior aspect of his knee. Denies any acute injury. Has had prior left knee medial meniscectomy and feels like this pain is similar nature. He does not report any clicking or catching. Allergies   Allergen Reactions    Tiger Balm [Camphor-Menthol] Contact Dermatitis       Current Outpatient Medications   Medication Sig    fexofenadine-pseudoephedrine (ALLEGRA-D 24) 180-240 mg per tablet Take 1 Tab by mouth daily. scopolamine (TRANSDERM-SCOP) 1 mg over 3 days pt3d 1 Patch by TransDERmal route every seventy-two (72) hours. tretinoin (RETIN-A) 0.05 % topical cream APPLY TO AFFECTED AREA NIGHTLY    cholecalciferol (VITAMIN D3) 1,000 unit cap Take  by mouth daily. calcium-cholecalciferol, d3, (CALCIUM 600 + D) 600-125 mg-unit tab Take  by mouth.    cyanocobalamin (VITAMIN B-12) 1,000 mcg tablet Take 1,000 mcg by mouth daily. zinc 50 mg tab tablet Take  by mouth daily. B.infantis-B.ani-B.long-B.bifi (PROBIOTIC 4X) 10-15 mg TbEC Take  by mouth. glucosamine-chondroitin (ARTHX) 500-400 mg cap Take 1 Cap by mouth daily. multivitamin (ONE A DAY) tablet Take 1 Tab by mouth daily. MAGNESIUM CITRATE PO Take  by mouth. ibuprofen (MOTRIN) 200 mg tablet Take  by mouth. No current facility-administered medications for this visit.        Past Medical History:   Diagnosis Date    Arthritis     lumbar djd - Dr. Noe Munoz        Past Surgical History:   Procedure Laterality Date    HX HERNIA REPAIR  2006    bilateral inguinal       Family History   Problem Relation Age of Onset    Thyroid Disease Mother     Hypertension Mother     High Cholesterol Mother     COPD Father     Hypertension Father     Cancer Maternal Grandmother         breast    Diabetes Maternal Grandmother         type 1        Social History     Socioeconomic History    Marital status:      Spouse name: Not on file    Number of children: Not on file    Years of education: Not on file    Highest education level: Not on file   Occupational History    Not on file   Tobacco Use    Smoking status: Never    Smokeless tobacco: Never   Substance and Sexual Activity    Alcohol use: Yes     Alcohol/week: 2.5 standard drinks     Types: 1 Glasses of wine, 1 Cans of beer, 1 Shots of liquor per week    Drug use: No    Sexual activity: Yes     Partners: Female   Other Topics Concern    Not on file   Social History Narrative    Not on file     Social Determinants of Health     Financial Resource Strain: Not on file   Food Insecurity: Not on file   Transportation Needs: Not on file   Physical Activity: Not on file   Stress: Not on file   Social Connections: Not on file   Intimate Partner Violence: Not on file   Housing Stability: Not on file       Review of Systems   Musculoskeletal:         Right knee pain     Vitals: There were no vitals taken for this visit. There is no height or weight on file to calculate BMI. Ortho Exam     Right knee: No effusion of the knee is noted. Patient has full range of motion from 0-130 but has pain with hyperflexion hyperextension maneuvers. There is medial joint line tenderness. Pain is worsened with Lauren's maneuver. Knee is stable to varus and valgus stress at 0 and 30 degrees. There is a negative Lachman's, negative anterior and posterior drawer tests. Distally the extremity is neurovascularly intact. Left knee: There is no effusion noted. There is no deformity, ecchymosis, erythema or abrasions about the knee. There is pain-free range of motion from 0 to 130 degrees. No crepitance is noted, patient is able to straight leg raise. There is no tenderness to palpation at the medial or lateral joint lines. No tenderness to palpation along the extensor mechanism. Negative Lauren's exam.  There is a negative Lachman's exam, negative anterior and posterior drawer tests. Knee is stable to varus and valgus stress at 0 and 30 degrees. Distally the extremity is neurovascularly intact. X-rays of bilateral knees show no evidence of acute fracture dislocations. He has well-maintained joint spaces in all compartments of the bilateral knees. Normal alignment of both knees. XR Results (most recent):  Results from Appointment encounter on 09/12/22    XR KNEE RT MIN 4 V    Narrative  4 view x-ray of the right knee reveals no evidence of fracture or desiccation. There is no evidence of narrowing of the medial joint space no evidence osteophytic changes. ASSESSMENT/PLAN:    Patient is 79-year-old male with concern for right knee medial meniscus tear. He is a significantly active individual who continues to run, do jujitsu and workout on a very regular basis. Given that his pain is significant enough to limit his activity I think is reasonable to proceed with an MRI of the right knee for evaluation of medial meniscus pathology. We will see him back following his MRI.         Morgan Thomason MD

## 2022-09-12 NOTE — LETTER
9/12/2022    Patient: Deniz Jurado   YOB: 1970   Date of Visit: 9/12/2022     Carlos Saavedra MD  [place] [id number]  [address] [place]  Via Fax: [phone]    Dear Carlos Saavedra MD,      Thank you for referring Mr. Susan Duran to [facility] for evaluation. My notes for this consultation are attached. If you have questions, please do not hesitate to call me. I look forward to following your patient along with you.       Sincerely,    Belle Roblero MD

## 2022-10-31 ENCOUNTER — OFFICE VISIT (OUTPATIENT)
Dept: ORTHOPEDIC SURGERY | Age: 52
End: 2022-10-31
Payer: COMMERCIAL

## 2022-10-31 DIAGNOSIS — S83.231D COMPLEX TEAR OF MEDIAL MENISCUS OF RIGHT KNEE AS CURRENT INJURY, SUBSEQUENT ENCOUNTER: Primary | ICD-10-CM

## 2022-10-31 PROCEDURE — 99214 OFFICE O/P EST MOD 30 MIN: CPT | Performed by: ORTHOPAEDIC SURGERY

## 2022-10-31 NOTE — LETTER
10/31/2022    Patient: Merrick Ferris   YOB: 1970   Date of Visit: 10/31/2022     Lizette Moya MD  [facility] [address]  [address] [place]  Via Fax: [phone]    Dear Lizette Moya MD,      Thank you for referring Mr. Maye Paredes to [facility] for evaluation. My notes for this consultation are attached. If you have questions, please do not hesitate to call me. I look forward to following your patient along with you.       Sincerely,    Daisy Pate MD

## 2022-10-31 NOTE — PROGRESS NOTES
Aurora Salinas (: 1970) is a 47 y.o. male, patient, here for evaluation of the following chief complaint(s):  Knee Pain (Right knee pain)       HPI:    Patient returns the office now status post MRI evaluation of the right shoulder. Patient continues to note medial based knee pain. Allergies   Allergen Reactions    Tiger Balm [Camphor-Menthol] Contact Dermatitis       Current Outpatient Medications   Medication Sig    fexofenadine-pseudoephedrine (ALLEGRA-D 24) 180-240 mg per tablet Take 1 Tab by mouth daily. scopolamine (TRANSDERM-SCOP) 1 mg over 3 days pt3d 1 Patch by TransDERmal route every seventy-two (72) hours. tretinoin (RETIN-A) 0.05 % topical cream APPLY TO AFFECTED AREA NIGHTLY    cholecalciferol (VITAMIN D3) 1,000 unit cap Take  by mouth daily. calcium-cholecalciferol, d3, (CALCIUM 600 + D) 600-125 mg-unit tab Take  by mouth.    cyanocobalamin (VITAMIN B-12) 1,000 mcg tablet Take 1,000 mcg by mouth daily. zinc 50 mg tab tablet Take  by mouth daily. B.infantis-B.ani-B.long-B.bifi (PROBIOTIC 4X) 10-15 mg TbEC Take  by mouth. glucosamine-chondroitin (ARTHX) 500-400 mg cap Take 1 Cap by mouth daily. multivitamin (ONE A DAY) tablet Take 1 Tab by mouth daily. MAGNESIUM CITRATE PO Take  by mouth. ibuprofen (MOTRIN) 200 mg tablet Take  by mouth. No current facility-administered medications for this visit.        Past Medical History:   Diagnosis Date    Arthritis     lumbar djd - Dr. Manning Gitelman        Past Surgical History:   Procedure Laterality Date    HX HERNIA REPAIR  2006    bilateral inguinal       Family History   Problem Relation Age of Onset    Thyroid Disease Mother     Hypertension Mother     High Cholesterol Mother     COPD Father     Hypertension Father     Cancer Maternal Grandmother         breast    Diabetes Maternal Grandmother         type 1        Social History     Socioeconomic History    Marital status:      Spouse name: Not on file    Number of children: Not on file    Years of education: Not on file    Highest education level: Not on file   Occupational History    Not on file   Tobacco Use    Smoking status: Never    Smokeless tobacco: Never   Substance and Sexual Activity    Alcohol use: Yes     Alcohol/week: 2.5 standard drinks     Types: 1 Glasses of wine, 1 Cans of beer, 1 Shots of liquor per week    Drug use: No    Sexual activity: Yes     Partners: Female   Other Topics Concern    Not on file   Social History Narrative    Not on file     Social Determinants of Health     Financial Resource Strain: Not on file   Food Insecurity: Not on file   Transportation Needs: Not on file   Physical Activity: Not on file   Stress: Not on file   Social Connections: Not on file   Intimate Partner Violence: Not on file   Housing Stability: Not on file       Review of Systems   Musculoskeletal:         Right knee pain     Vitals: There were no vitals taken for this visit. There is no height or weight on file to calculate BMI. Ortho Exam     Right knee: No effusion. There is no pain with manipulation of the patella. No crepitation with passive or active range of motion of the patella. Positive medial joint line tenderness. Positive medial Lauren's maneuver. There is no lateral joint line tenderness. Lauren's maneuvers negative for lateral joint line tenderness. Collateral ligaments are intact. Lachman's test negative. Anterior drawer and posterior drawer negative. There is no soft tissue swelling distally. Neurovascular examination is intact. MRI is positive for undersurface tear of the posterior horn of the medial meniscus. ASSESSMENT/PLAN:    We have gone over the MRI. He is familiar with this given his prior surgical history on the left knee. We discussed operative versus nonoperative management. Patient is leaning more toward surgical intervention. I think it is reasonable and appropriate.   I have refreshed in his memory in regards to the surgery and the technique and the expectations. We have gone over the surgical risks. The risks and benefits were described to the patient. The patient understands there is a risk of infection, postoperative pain, numbness, tingling, stiffness DVT, PE, MI, CVA and any other unforeseen events. The patient also understands there is a long rehabilitative process that typically follows the surgical procedure. We talked about the possibility of not being able to alleviate all of the discomfort. Also, I explained  there is no guarantee all function and strength will return. The patient understands the possiblity that  implants may be utilized during this surgery. The patient also understands the generalized, associated risk of anesthetic and wishes to proceed in an elective fashion.         Darius Conrad MD

## 2022-11-01 DIAGNOSIS — S83.231D COMPLEX TEAR OF MEDIAL MENISCUS OF RIGHT KNEE AS CURRENT INJURY, SUBSEQUENT ENCOUNTER: Primary | ICD-10-CM

## 2022-11-28 DIAGNOSIS — Z98.890 HISTORY OF ARTHROSCOPY OF RIGHT KNEE: Primary | ICD-10-CM

## 2022-11-28 RX ORDER — OXYCODONE AND ACETAMINOPHEN 5; 325 MG/1; MG/1
1 TABLET ORAL
Qty: 30 TABLET | Refills: 0 | Status: SHIPPED | OUTPATIENT
Start: 2022-11-28 | End: 2022-12-03

## 2022-12-08 ENCOUNTER — OFFICE VISIT (OUTPATIENT)
Dept: ORTHOPEDIC SURGERY | Age: 52
End: 2022-12-08
Payer: COMMERCIAL

## 2022-12-08 DIAGNOSIS — Z98.890 STATUS POST ARTHROSCOPY OF LEFT KNEE: Primary | ICD-10-CM

## 2022-12-08 PROCEDURE — 99024 POSTOP FOLLOW-UP VISIT: CPT | Performed by: ORTHOPAEDIC SURGERY

## 2022-12-08 NOTE — PROGRESS NOTES
Tilmon Boast (: 1970) is a 47 y.o. male, patient, here for evaluation of the following chief complaint(s):  Knee Pain (Right knee post op)       HPI:    Patient returns for his first postoperative visit after right knee partial medial meniscectomy. He is doing very well and is off all pain medication. He is walking without assistance and has returned to doing light activities. Denies any complications after surgery. Patient denies any chest pain or cough [place] or shortness of breath    Allergies   Allergen Reactions    Tiger Balm [Camphor-Menthol] Contact Dermatitis       Current Outpatient Medications   Medication Sig    fexofenadine-pseudoephedrine (ALLEGRA-D 24) 180-240 mg per tablet Take 1 Tab by mouth daily. scopolamine (TRANSDERM-SCOP) 1 mg over 3 days pt3d 1 Patch by TransDERmal route every seventy-two (72) hours. tretinoin (RETIN-A) 0.05 % topical cream APPLY TO AFFECTED AREA NIGHTLY    cholecalciferol (VITAMIN D3) 1,000 unit cap Take  by mouth daily. calcium-cholecalciferol, d3, (CALCIUM 600 + D) 600-125 mg-unit tab Take  by mouth.    cyanocobalamin (VITAMIN B-12) 1,000 mcg tablet Take 1,000 mcg by mouth daily. zinc 50 mg tab tablet Take  by mouth daily. B.infantis-B.ani-B.long-B.bifi (PROBIOTIC 4X) 10-15 mg TbEC Take  by mouth. glucosamine-chondroitin (ARTHX) 500-400 mg cap Take 1 Cap by mouth daily. multivitamin (ONE A DAY) tablet Take 1 Tab by mouth daily. MAGNESIUM CITRATE PO Take  by mouth. ibuprofen (MOTRIN) 200 mg tablet Take  by mouth. No current facility-administered medications for this visit.        Past Medical History:   Diagnosis Date    Arthritis     lumbar djd - Dr. Leatha Anderson        Past Surgical History:   Procedure Laterality Date    HX HERNIA REPAIR  2006    bilateral inguinal       Family History   Problem Relation Age of Onset    Thyroid Disease Mother     Hypertension Mother     High Cholesterol Mother     COPD Father     Hypertension Father Cancer Maternal Grandmother         breast    Diabetes Maternal Grandmother         type 1        Social History     Socioeconomic History    Marital status:      Spouse name: Not on file    Number of children: Not on file    Years of education: Not on file    Highest education level: Not on file   Occupational History    Not on file   Tobacco Use    Smoking status: Never    Smokeless tobacco: Never   Substance and Sexual Activity    Alcohol use: Yes     Alcohol/week: 2.5 standard drinks     Types: 1 Glasses of wine, 1 Cans of beer, 1 Shots of liquor per week    Drug use: No    Sexual activity: Yes     Partners: Female   Other Topics Concern    Not on file   Social History Narrative    Not on file     Social Determinants of Health     Financial Resource Strain: Not on file   Food Insecurity: Not on file   Transportation Needs: Not on file   Physical Activity: Not on file   Stress: Not on file   Social Connections: Not on file   Intimate Partner Violence: Not on file   Housing Stability: Not on file       Review of Systems   Musculoskeletal:         Right knee post op     Vitals: There were no vitals taken for this visit. There is no height or weight on file to calculate BMI. Ortho Exam     Right knee: Incisions well-healing. Sutures removed today. No surrounding erythema or drainage. Minimal postoperative effusion. Range of motion 0-120. Neurovascular exam is normal.  Calf is soft and supple. Negative Homans' sign. ASSESSMENT/PLAN:    Patient is doing well after his right knee partial medial meniscectomy. He will continue to do exercises on his own as he is very motivated and normally goes to the gym. He returns to the office in 4 weeks for reevaluation.         Ivan Brown MD

## 2023-02-21 ENCOUNTER — OFFICE VISIT (OUTPATIENT)
Dept: ORTHOPEDIC SURGERY | Age: 53
End: 2023-02-21
Payer: COMMERCIAL

## 2023-02-21 DIAGNOSIS — Z98.890 STATUS POST ARTHROSCOPY OF RIGHT KNEE: Primary | ICD-10-CM

## 2023-02-21 PROCEDURE — 99024 POSTOP FOLLOW-UP VISIT: CPT | Performed by: ORTHOPAEDIC SURGERY

## 2023-02-21 NOTE — PROGRESS NOTES
Albino Colón (: 1970) is a 47 y.o. male, patient, here for evaluation of the following chief complaint(s):  Knee Pain (Right knee pain )       HPI:    Patient presents the office today status post arthroscopy of his right knee. He states his knee has been feeling very well. He has been noticing some discomfort as he describes along the posterior lateral aspect of the knee. He is not noted any swelling. His perception is that this is related to the Baker's cyst.  He is therefore here today for evaluation. He has had no recent level of trauma to the knee    Allergies   Allergen Reactions    Tiger Balm [Camphor-Menthol] Contact Dermatitis       Current Outpatient Medications   Medication Sig    fexofenadine-pseudoephedrine (ALLEGRA-D 24) 180-240 mg per tablet Take 1 Tab by mouth daily. scopolamine (TRANSDERM-SCOP) 1 mg over 3 days pt3d 1 Patch by TransDERmal route every seventy-two (72) hours. tretinoin (RETIN-A) 0.05 % topical cream APPLY TO AFFECTED AREA NIGHTLY    cholecalciferol (VITAMIN D3) 1,000 unit cap Take  by mouth daily. calcium-cholecalciferol, d3, (CALCIUM 600 + D) 600-125 mg-unit tab Take  by mouth.    cyanocobalamin (VITAMIN B-12) 1,000 mcg tablet Take 1,000 mcg by mouth daily. zinc 50 mg tab tablet Take  by mouth daily. B.infantis-B.ani-B.long-B.bifi (PROBIOTIC 4X) 10-15 mg TbEC Take  by mouth. glucosamine-chondroitin (ARTHX) 500-400 mg cap Take 1 Cap by mouth daily. multivitamin (ONE A DAY) tablet Take 1 Tab by mouth daily. MAGNESIUM CITRATE PO Take  by mouth. ibuprofen (MOTRIN) 200 mg tablet Take  by mouth. No current facility-administered medications for this visit.        Past Medical History:   Diagnosis Date    Arthritis     lumbar djd - Dr. Stanislav England        Past Surgical History:   Procedure Laterality Date    HX HERNIA REPAIR  2006    bilateral inguinal       Family History   Problem Relation Age of Onset    Thyroid Disease Mother     Hypertension Mother High Cholesterol Mother     COPD Father     Hypertension Father     Cancer Maternal Grandmother         breast    Diabetes Maternal Grandmother         type 1        Social History     Socioeconomic History    Marital status:      Spouse name: Not on file    Number of children: Not on file    Years of education: Not on file    Highest education level: Not on file   Occupational History    Not on file   Tobacco Use    Smoking status: Never    Smokeless tobacco: Never   Substance and Sexual Activity    Alcohol use: Yes     Alcohol/week: 2.5 standard drinks     Types: 1 Glasses of wine, 1 Cans of beer, 1 Shots of liquor per week    Drug use: No    Sexual activity: Yes     Partners: Female   Other Topics Concern    Not on file   Social History Narrative    Not on file     Social Determinants of Health     Financial Resource Strain: Not on file   Food Insecurity: Not on file   Transportation Needs: Not on file   Physical Activity: Not on file   Stress: Not on file   Social Connections: Not on file   Intimate Partner Violence: Not on file   Housing Stability: Not on file       Review of Systems   Musculoskeletal:         Right knee pain      Vitals: There were no vitals taken for this visit. There is no height or weight on file to calculate BMI. Ortho Exam       Patient is alert and oriented x3. Patient is in no acute distress. Patient ambulates with a nonantalgic gait. Right knee: Portal sites of healed. He has no effusion. I am unable to palpate any evidence of a Baker's cyst in the knee. He has no effusion he has full range of motion of the knee joint. He has some posterior lateral tenderness right at the insertion site of his hamstring and seems to be worsened with hamstring contraction. He has negative Lauren's maneuver neurovascular examination is intact. Calf is soft and supple.     ASSESSMENT/PLAN:    It would appear his current level of pain is more consistent with hamstring type irritation. We have gone over his current exercise routine. I have asked him to work more on a stretch and light weight repetitive hamstring exercises. I suspect this will resolve in a timely fashion.   If his pain worsens, he is to return to the office        Anitha Parada MD